# Patient Record
Sex: FEMALE | Race: OTHER | Employment: FULL TIME | URBAN - METROPOLITAN AREA
[De-identification: names, ages, dates, MRNs, and addresses within clinical notes are randomized per-mention and may not be internally consistent; named-entity substitution may affect disease eponyms.]

---

## 2017-02-28 ENCOUNTER — ALLSCRIPTS OFFICE VISIT (OUTPATIENT)
Dept: OTHER | Facility: OTHER | Age: 27
End: 2017-02-28

## 2017-04-17 ENCOUNTER — GENERIC CONVERSION - ENCOUNTER (OUTPATIENT)
Dept: OTHER | Facility: OTHER | Age: 27
End: 2017-04-17

## 2017-04-18 ENCOUNTER — GENERIC CONVERSION - ENCOUNTER (OUTPATIENT)
Dept: OTHER | Facility: OTHER | Age: 27
End: 2017-04-18

## 2017-04-25 ENCOUNTER — ALLSCRIPTS OFFICE VISIT (OUTPATIENT)
Dept: OTHER | Facility: OTHER | Age: 27
End: 2017-04-25

## 2017-06-27 ENCOUNTER — ALLSCRIPTS OFFICE VISIT (OUTPATIENT)
Dept: OTHER | Facility: OTHER | Age: 27
End: 2017-06-27

## 2017-10-12 ENCOUNTER — ALLSCRIPTS OFFICE VISIT (OUTPATIENT)
Dept: OTHER | Facility: OTHER | Age: 27
End: 2017-10-12

## 2017-10-12 LAB
BILIRUB UR QL STRIP: NORMAL
CLARITY UR: NORMAL
COLOR UR: CLEAR
GLUCOSE (HISTORICAL): NORMAL
HGB UR QL STRIP.AUTO: NORMAL
KETONES UR STRIP-MCNC: 4 MG/DL
LEUKOCYTE ESTERASE UR QL STRIP: 1
NITRITE UR QL STRIP: NORMAL
PH UR STRIP.AUTO: 6 [PH]
PROT UR STRIP-MCNC: NORMAL MG/DL
SP GR UR STRIP.AUTO: 1
UROBILINOGEN UR QL STRIP.AUTO: 0.2

## 2017-10-29 NOTE — PROGRESS NOTES
Assessment    1  Bacterial vaginosis (616 10,041 9) (N76 0,B96 89)   2  Burning with urination (788 1) (R30 0)   3  Irregular bleeding (626 4) (N92 6)    Plan  Bacterial vaginosis    · Clotrimazole-Betamethasone 1-0 05 % External Cream; APPLY  AND RUB  IN ATHIN FILM TO AFFECTED AREAS TWICE DAILY  (AM AND PM)   Rx By: Brigid Rios; Dispense: 0 Days ; #:1 X 15 GM Tube; Refill: 1;Bacterial vaginosis; BEENA = N; Sent To: [facility]/PHARMACY #[id number]  · MetroNIDAZOLE 500 MG Oral Tablet; TAKE 1 TABLET TWICE DAILY UNTILFINISHED   Rx By: Brigid Rios; Dispense: 7 Days ; #:14 Tablet; Refill: 0;Bacterial vaginosis; BEENA = N; Sent To: [facility]/PHARMACY #8165 Burning with urination, Urinary frequency    · (1) URINALYSIS (will reflex a microscopy if leukocytes, occult blood, protein or nitrites arenot within normal limits); Status:Active - Retrospective By Protocol Authorization; Requested for:12Oct2017;    Perform:[facility] In Office Collection; ESQ:[id number]; Last Updated By:Lynne Mas; 10/12/2017 4:14:43 PM;Ordered;with urination, Urinary frequency; Ordered By:Riedel, Pietro Fothergill;   · (1) URINE CULTURE; Source:Urine, Clean Catch; Status:Active - Retrospective ByProtocol Authorization; Requested for:12Oct2017;    Perform:[facility] In Office Collection; FCY:[id number]; Last Updated By:Lynne Mas; 10/12/2017 4:14:17 PM;Ordered;with urination, Urinary frequency; Ordered By:Riedel, Pietro Fothergill;    Discussion/Summary  Discussion Summary:   As above patient complains of vaginal burning with urination  UA dip positive for ketones 1+ leukocytes  We will send for culture and sensitivity  Wet mount done positive for bacterial vaginosis recurrent  Will treat with metronidazole 500 mg twice daily for 1 week  Will give topical clotrimazole for external symptoms   Patient prefers not to restart birth control at this time was counseled that if her cycles continue to be irregular and far apart further evaluation would be indicated  Counseling Documentation With Imm: The patient was counseled regarding diagnostic results,-- instructions for management,-- risk factor reductions,-- prognosis,-- impressions,-- risks and benefits of treatment options,-- importance of compliance with treatment,-- Recurrent bacterial vaginosis dysuria, irregular bleeding  total time of encounter was 25 minutes-- and-- 15 minutes was spent counseling  Chief Complaint  Chief Complaint Free Text Note Form: PATIENT IS HERE FOR PROBLEM VISIT, PATIENT C/O BURNING WHEN VOIDING, URINARY FREQUENCY,PATIENT REFUSSES PELVIC EXAM DO TO MENSES      History of Present Illness  HPI: As noted above patient agreed for pelvic exam  Complains of burning with urination been going on for the past week or so  Was here in June for annual exam Pap pelvic and breast exam which was normal  Patient states her periods are for the most part are regular once a month, this last period was somewhat delayed she attributes the lateness due to stress in her personal life  Patient states she is sexually active from time to time however her partner lives in [place] so it is infrequent  Review of Systems  Focused-Female:  Constitutional: No fever, no chills, feels well, no tiredness, no recent weight gain or loss  ENT: no ear ache, no loss of hearing, no nosebleeds or nasal discharge, no sore throat or hoarseness  Cardiovascular: no complaints of slow or fast heart rate, no chest pain, no palpitations, no leg claudication or lower extremity edema  Respiratory: no complaints of shortness of breath, no wheezing, no dyspnea on exertion, no orthopnea or PND  Breasts: no complaints of breast pain, breast lump or nipple discharge  Gastrointestinal: no complaints of abdominal pain, no constipation, no nausea or diarrhea, no vomiting, no bloody stools    Genitourinary: no complaints of dysuria, no incontinence, no pelvic pain, no dysmenorrhea, no vaginal discharge or abnormal vaginal bleeding  Musculoskeletal: no complaints of arthralgia, no myalgia, no joint swelling or stiffness, no limb pain or swelling  Integumentary: no complaints of skin rash or lesion, no itching or dry skin, no skin wounds  Neurological: no complaints of headache, no confusion, no numbness or tingling, no dizziness or fainting  Active Problems  1  Bacterial vaginosis (616 10,041 9) (N76 0,B96 89)   2  Birth control counseling (V25 09) (Z30 09)   3  Encounter for cervical Pap smear with pelvic exam (V76 2,V72 31) (Z01 419)   4  Encounter for IUD removal (V25 12) (Z30 432)   5  Screening for STD (sexually transmitted disease) (V74 5) (Z11 3)    Family History  Mother    1  No pertinent family history  Father    2  No pertinent family history    Social History   · Caffeine use (V49 89) (F15 90)   ·    · Never a smoker   · One child   · Social alcohol use (Z78 9)    Current Meds   1  No Reported Medications Recorded    Allergies  1  No Known Drug Allergies    Vitals  Vital Signs    Recorded: 22QJI1721 79:16RC   Systolic 550, RUE, Sitting   Diastolic 82, RUE, Sitting   Height 5 ft 8 in   Weight 187 lb    BMI Calculated 28 43   BSA Calculated 1 99   LMP 12Oct2017       Physical Exam   Constitutional  General appearance: No acute distress, well appearing and well nourished  Genitourinary  External genitalia: Normal and no lesions appreciated  Vagina: Normal, no lesions or dryness appreciated  -- Light menstrual flow noted  Cervix: Normal, no palpable masses  -- Late menstrual flow noted day 1 of menses  Uterus: Normal, non-tender, not enlarged, and no palpable masses  -- Normal size uterus normal bimanual exam no pelvic masses noted  Adnexa/parametria: Normal, non-tender and no fullness or masses appreciated         Signatures   Electronically signed by : Luis Iqbal DO; Oct 12 2017  4:30PM EST                       (Author)

## 2018-01-12 VITALS
BODY MASS INDEX: 27.58 KG/M2 | DIASTOLIC BLOOD PRESSURE: 80 MMHG | SYSTOLIC BLOOD PRESSURE: 117 MMHG | HEIGHT: 68 IN | WEIGHT: 182 LBS

## 2018-01-13 VITALS
HEIGHT: 68 IN | WEIGHT: 183.25 LBS | SYSTOLIC BLOOD PRESSURE: 120 MMHG | BODY MASS INDEX: 27.77 KG/M2 | DIASTOLIC BLOOD PRESSURE: 82 MMHG

## 2018-01-13 VITALS
SYSTOLIC BLOOD PRESSURE: 112 MMHG | DIASTOLIC BLOOD PRESSURE: 82 MMHG | WEIGHT: 187 LBS | BODY MASS INDEX: 28.34 KG/M2 | HEIGHT: 68 IN

## 2018-01-13 NOTE — MISCELLANEOUS
Provider Comments  Provider Comments:   pt no show 04/18/2017 tried reaching on phone unable to hear me and pt hung up      Signatures   Electronically signed by :  [facility], ; Apr 18 2017  4:56PM EST                       (Author)

## 2018-01-14 VITALS
DIASTOLIC BLOOD PRESSURE: 82 MMHG | WEIGHT: 180.25 LBS | BODY MASS INDEX: 27.32 KG/M2 | HEIGHT: 68 IN | SYSTOLIC BLOOD PRESSURE: 120 MMHG

## 2018-01-16 ENCOUNTER — ALLSCRIPTS OFFICE VISIT (OUTPATIENT)
Dept: OTHER | Facility: OTHER | Age: 28
End: 2018-01-16

## 2018-01-16 DIAGNOSIS — O20.9 HEMORRHAGE IN EARLY PREGNANCY: ICD-10-CM

## 2018-01-16 LAB — HCG, QUALITATIVE (HISTORICAL): POSITIVE

## 2018-01-17 ENCOUNTER — APPOINTMENT (OUTPATIENT)
Dept: LAB | Facility: HOSPITAL | Age: 28
End: 2018-01-17
Attending: OBSTETRICS & GYNECOLOGY
Payer: COMMERCIAL

## 2018-01-17 DIAGNOSIS — O20.9 HEMORRHAGE IN EARLY PREGNANCY: ICD-10-CM

## 2018-01-17 LAB
ABO GROUP BLD: NORMAL
B-HCG SERPL-ACNC: 9896.5 MIU/ML
BLD GP AB SCN SERPL QL: NEGATIVE
PROGEST SERPL-MCNC: 16.2 NG/ML
RH BLD: POSITIVE
SPECIMEN EXPIRATION DATE: NORMAL
TSH SERPL DL<=0.05 MIU/L-ACNC: 1.09 UIU/ML (ref 0.36–3.74)

## 2018-01-17 PROCEDURE — 36415 COLL VENOUS BLD VENIPUNCTURE: CPT

## 2018-01-17 PROCEDURE — 86901 BLOOD TYPING SEROLOGIC RH(D): CPT

## 2018-01-17 PROCEDURE — 84144 ASSAY OF PROGESTERONE: CPT

## 2018-01-17 PROCEDURE — 84702 CHORIONIC GONADOTROPIN TEST: CPT

## 2018-01-17 PROCEDURE — 86900 BLOOD TYPING SEROLOGIC ABO: CPT

## 2018-01-17 PROCEDURE — 86850 RBC ANTIBODY SCREEN: CPT

## 2018-01-17 PROCEDURE — 84443 ASSAY THYROID STIM HORMONE: CPT

## 2018-01-17 NOTE — MISCELLANEOUS
Provider Comments  Provider Comments:   pt no showed today pt does have an appointment tomorrow 04/18/2017 for her annual      Signatures   Electronically signed by : Forrest Parra, ; Apr 17 2017  4:53PM EST                       (Author)

## 2018-01-23 VITALS
DIASTOLIC BLOOD PRESSURE: 64 MMHG | SYSTOLIC BLOOD PRESSURE: 112 MMHG | WEIGHT: 185.13 LBS | BODY MASS INDEX: 28.06 KG/M2 | HEIGHT: 68 IN

## 2018-01-23 NOTE — MISCELLANEOUS
Message  Return to work or school:   Maribell Lee is under my professional care  She was seen in my office on 1/16/18   She is able to return to work on  1/16/18      Please excuse patient from work early this morning due to her scheduled appointment          Signatures   Electronically signed by : GREG Gaspar ; Jan 16 2018  7:53AM EST                       (Author)

## 2018-01-29 ENCOUNTER — TRANSCRIBE ORDERS (OUTPATIENT)
Dept: ADMINISTRATIVE | Facility: HOSPITAL | Age: 28
End: 2018-01-29

## 2018-01-29 ENCOUNTER — OFFICE VISIT (OUTPATIENT)
Dept: OBGYN CLINIC | Facility: CLINIC | Age: 28
End: 2018-01-29
Payer: COMMERCIAL

## 2018-01-29 ENCOUNTER — APPOINTMENT (OUTPATIENT)
Dept: LAB | Facility: HOSPITAL | Age: 28
End: 2018-01-29
Attending: OBSTETRICS & GYNECOLOGY
Payer: COMMERCIAL

## 2018-01-29 ENCOUNTER — HOSPITAL ENCOUNTER (OUTPATIENT)
Dept: ULTRASOUND IMAGING | Facility: HOSPITAL | Age: 28
Discharge: HOME | End: 2018-01-29
Attending: OBSTETRICS & GYNECOLOGY
Payer: COMMERCIAL

## 2018-01-29 VITALS
SYSTOLIC BLOOD PRESSURE: 130 MMHG | DIASTOLIC BLOOD PRESSURE: 84 MMHG | HEIGHT: 68 IN | BODY MASS INDEX: 28.98 KG/M2 | WEIGHT: 191.2 LBS

## 2018-01-29 DIAGNOSIS — O20.0 THREATENED ABORTION: ICD-10-CM

## 2018-01-29 DIAGNOSIS — O20.9 BLEEDING IN EARLY PREGNANCY: ICD-10-CM

## 2018-01-29 DIAGNOSIS — O20.0 THREATENED ABORTION: Primary | ICD-10-CM

## 2018-01-29 LAB
B-HCG SERPL-ACNC: ABNORMAL MIU/ML
PROGEST SERPL-MCNC: 14.9 NG/ML

## 2018-01-29 PROCEDURE — 36415 COLL VENOUS BLD VENIPUNCTURE: CPT

## 2018-01-29 PROCEDURE — 76815 OB US LIMITED FETUS(S): CPT

## 2018-01-29 PROCEDURE — 99214 OFFICE O/P EST MOD 30 MIN: CPT | Performed by: OBSTETRICS & GYNECOLOGY

## 2018-01-29 PROCEDURE — 84702 CHORIONIC GONADOTROPIN TEST: CPT

## 2018-01-29 PROCEDURE — 76830 TRANSVAGINAL US NON-OB: CPT | Performed by: OBSTETRICS & GYNECOLOGY

## 2018-01-29 PROCEDURE — 84144 ASSAY OF PROGESTERONE: CPT

## 2018-01-29 RX ORDER — DOCUSATE SODIUM 100 MG/1
100 CAPSULE, LIQUID FILLED ORAL 2 TIMES DAILY
COMMUNITY
End: 2018-05-25 | Stop reason: ALTCHOICE

## 2018-01-29 NOTE — PROGRESS NOTES
Assessment/Plan    Problem List Items Addressed This Visit        Other    Threatened  - Primary    Relevant Orders    hCG, quantitative    Progesterone    AMB US OB < 14 weeks single or first gestation level 1    Bleeding in early pregnancy    Relevant Orders    hCG, quantitative    Progesterone    AMB US OB < 14 weeks single or first gestation level 1        Possible failed pregnancy  She is measuring 6 weeks and 2/7 weeks by gestational sac with no fetal pole and no yolk sac  She did have an ultrasound 2 weeks ago that showed a gestational sac at 5 weeks and 4/7 days  She was sent to [facility] Department for formal ultrasound to rule out ectopic pregnancy  She was asked to return in 2 days for discussion of results    In the meantime, I did discussed with patient the prognosis  I discussed with patient that if this were a failed pregnancy, that we can treat her with expectant management versus medical versus surgical completion  In the meantime, I asked for her to call if her bleeding increases or if she has severe pain  Subjective   31 y/o  here for a problem visit  She was seen on 18 for a pregnancy confirmation  She had a h/o 1  x 1, no previous problems, 9 lbs and 1 VIP     Her LMP was on 17  She is 7 2/7 days by LMP with GEORGE on 9/15/18  She comes in for a problem  Her periods are regular, occurring every 28 days  She has not been on contraception recently  When she was seen 2 weeks ago, she had an ultrasound limited in the office that showed a gestational sac at 5 weeks and 4/7 days  Labs on 18  Her blood type is A positive  Progesterone was 16 20  HCG quantitative at 9896 5    She is complaining of vaginal bleeding since Thursday  She states that her bleeding on Thursday was fairly heavy with passing clots that were small  She changes about 2-3 pads per day  She is only spotting today she denies any pelvic pain or cramping    She does complain of a headache  Review of Systems   Constitutional: Negative  HENT: Negative  Eyes: Negative  Respiratory: Negative  Cardiovascular: Negative  Gastrointestinal: Negative  Endocrine: Negative  Genitourinary: Positive for vaginal bleeding  As noted in HPI   Musculoskeletal: Negative  Skin: Negative  Allergic/Immunologic: Negative  Neurological: Negative  Hematological: Negative  Psychiatric/Behavioral: Negative  Physical Exam   Constitutional: She is oriented to person, place, and time  She appears well-developed and well-nourished  Genitourinary: Uterus normal  There is bleeding in the vagina  Right adnexum does not display mass, does not display tenderness and does not display fullness  Left adnexum does not display mass, does not display tenderness and does not display fullness  Cervix does not exhibit motion tenderness or discharge  Uterus is anteverted  Uterus is not tender or mobile  Abdominal: Soft  There is no tenderness  There is no rebound and no guarding  Neurological: She is alert and oriented to person, place, and time  Psychiatric: She has a normal mood and affect  Scant amount of bleeding, os is closed         Imaging  Limited office ultrasound:  Shows a gestational sac 1 73 x 0 94 x 1 65 cm, 6 2/7 weeks  No fetal pole or yolk sac

## 2018-01-29 NOTE — PATIENT INSTRUCTIONS
Threatened Miscarriage   WHAT YOU NEED TO KNOW:   A threatened miscarriage occurs when you have vaginal bleeding within the first 20 weeks of pregnancy  It means that a miscarriage may happen  A threatened miscarriage may also be called a threatened   DISCHARGE INSTRUCTIONS:   Return to the emergency department if:   · You feel weak or faint  · Your pain or cramping in your abdomen or back gets worse  · You have vaginal bleeding that soaks 1 or more pads in an hour  · You pass material that looks like tissue or large clots  Contact your healthcare provider or obstetrician if:   · You have a fever  · You have trouble urinating, burning when you urinate, or feel a need to urinate often  · You have new or worsening vaginal bleeding  · You have vaginal pain or itching, or vaginal discharge that is yellow, green, or foul-smelling  · You have questions or concerns about your condition or care  Self-care: The following may help you manage your symptoms and decrease your risk for a miscarriage:  · Do not put anything in your vagina  Do not have sex, douche, or use tampons  These actions may increase your risk for infection and miscarriage  · Rest as directed  Do not exercise or do strenuous activities  These activities may cause  labor or miscarriage  Ask your healthcare provider what activities are okay to do  Stay healthy during pregnancy:   · Eat a variety of healthy foods  Healthy foods can help you get extra protein, water, and calories that you need while you are pregnant  Healthy foods include fruits, vegetables, whole-grain breads, low-fat dairy products, beans, lean meats, and fish  Avoid raw or undercooked meat and fish  Ask your healthcare provider if you need a special diet  · Take prenatal vitamins as directed  These help you get the right amount of vitamins and minerals  They may also decrease the risk of certain birth defects      · Do not drink alcohol or use illegal drugs  These can increase your risk for a miscarriage or harm your baby  · Do not smoke  Nicotine and other chemicals in cigarettes and cigars can harm your baby and cause miscarriage or  labor  Ask your healthcare provider for information if you currently smoke and need help to quit  E-cigarettes or smokeless tobacco still contain nicotine  Do not use these products  · Decrease your risk for an infection  Always wash your hands before eating or preparing meals  Do not spend time with people who are sick  Ask your healthcare provider if you need immunizations such as the flu or hepatitis B vaccine  Immunizations may decrease your risk for infections that could cause a miscarriage  · Manage your medical conditions  Keep your blood pressure and blood sugars under control  Maintain a healthy weight during pregnancy  Follow up with your obstetrician as directed: You may need to see your obstetrician frequently for ultrasounds or blood tests  Write down your questions so you remember to ask them during your visits  ©  26[address] [employer] Information is for End User's use only and may not be sold, redistributed or otherwise used for commercial purposes  All illustrations and images included in CareNotes® are the copyrighted property of [employer]  or [employer] Barreto  The above information is an  only  It is not intended as medical advice for individual conditions or treatments  Talk to your doctor, nurse or pharmacist before following any medical regimen to see if it is safe and effective for you

## 2018-01-30 ENCOUNTER — OFFICE VISIT (OUTPATIENT)
Dept: OBGYN CLINIC | Facility: CLINIC | Age: 28
End: 2018-01-30
Payer: COMMERCIAL

## 2018-01-30 VITALS — BODY MASS INDEX: 28.55 KG/M2 | SYSTOLIC BLOOD PRESSURE: 130 MMHG | WEIGHT: 187.8 LBS | DIASTOLIC BLOOD PRESSURE: 80 MMHG

## 2018-01-30 DIAGNOSIS — O20.9 BLEEDING IN EARLY PREGNANCY: ICD-10-CM

## 2018-01-30 DIAGNOSIS — O20.0 THREATENED ABORTION: Primary | ICD-10-CM

## 2018-01-30 PROCEDURE — 99213 OFFICE O/P EST LOW 20 MIN: CPT | Performed by: OBSTETRICS & GYNECOLOGY

## 2018-01-30 NOTE — PATIENT INSTRUCTIONS
Threatened Miscarriage   WHAT YOU NEED TO KNOW:   A threatened miscarriage occurs when you have vaginal bleeding within the first 20 weeks of pregnancy  It means that a miscarriage may happen  A threatened miscarriage may also be called a threatened   DISCHARGE INSTRUCTIONS:   Return to the emergency department if:   · You feel weak or faint  · Your pain or cramping in your abdomen or back gets worse  · You have vaginal bleeding that soaks 1 or more pads in an hour  · You pass material that looks like tissue or large clots  Contact your healthcare provider or obstetrician if:   · You have a fever  · You have trouble urinating, burning when you urinate, or feel a need to urinate often  · You have new or worsening vaginal bleeding  · You have vaginal pain or itching, or vaginal discharge that is yellow, green, or foul-smelling  · You have questions or concerns about your condition or care  Self-care: The following may help you manage your symptoms and decrease your risk for a miscarriage:  · Do not put anything in your vagina  Do not have sex, douche, or use tampons  These actions may increase your risk for infection and miscarriage  · Rest as directed  Do not exercise or do strenuous activities  These activities may cause  labor or miscarriage  Ask your healthcare provider what activities are okay to do  Stay healthy during pregnancy:   · Eat a variety of healthy foods  Healthy foods can help you get extra protein, water, and calories that you need while you are pregnant  Healthy foods include fruits, vegetables, whole-grain breads, low-fat dairy products, beans, lean meats, and fish  Avoid raw or undercooked meat and fish  Ask your healthcare provider if you need a special diet  · Take prenatal vitamins as directed  These help you get the right amount of vitamins and minerals  They may also decrease the risk of certain birth defects      · Do not drink alcohol or use illegal drugs  These can increase your risk for a miscarriage or harm your baby  · Do not smoke  Nicotine and other chemicals in cigarettes and cigars can harm your baby and cause miscarriage or  labor  Ask your healthcare provider for information if you currently smoke and need help to quit  E-cigarettes or smokeless tobacco still contain nicotine  Do not use these products  · Decrease your risk for an infection  Always wash your hands before eating or preparing meals  Do not spend time with people who are sick  Ask your healthcare provider if you need immunizations such as the flu or hepatitis B vaccine  Immunizations may decrease your risk for infections that could cause a miscarriage  · Manage your medical conditions  Keep your blood pressure and blood sugars under control  Maintain a healthy weight during pregnancy  Follow up with your obstetrician as directed: You may need to see your obstetrician frequently for ultrasounds or blood tests  Write down your questions so you remember to ask them during your visits  ©  [employer] Information is for End User's use only and may not be sold, redistributed or otherwise used for commercial purposes  All illustrations and images included in CareNotes® are the copyrighted property of [employer]  or [employer] Barreto  The above information is an  only  It is not intended as medical advice for individual conditions or treatments  Talk to your doctor, nurse or pharmacist before following any medical regimen to see if it is safe and effective for you

## 2018-01-30 NOTE — PROGRESS NOTES
Assessment/Plan  Problem List Items Addressed This Visit        Other    Threatened  - Primary    Relevant Orders    hCG, quantitative    US OB < 14 weeks single or first gestation level 1    Bleeding in early pregnancy    Relevant Orders    hCG, quantitative    US OB < 14 weeks single or first gestation level 1            Patient with possible failed intrauterine pregnancy  Discussed with patient recommendations to repeat an HCG quant in 2 days and to repeat a pelvic ultrasound in 1 week  I doubt she is having an ectopic pregnancy although this is still a possibility  I discussed with patient return parameters and instructions to call if with heavy bleeding or severe pelvic pain  If she is diagnosed with a failed intrauterine pregnancy, we will further  discuss options that I mentioned to patient today including expectant management, medical management versus surgical management of possibly a missed  or blighted ovum  She was asked to return in 1 week for discussion of results and follow-up  Subjective    Patient is a 78-year-old  3 para 1011 here for discussion of results  She was seen yesterday for vaginal bleeding in early pregnancy  Her LMP was 2017 and she is 7 weeks and 2/7 days by LMP  She is complaining of bleeding and some cramping  A pelvic ultrasound was done in the office that showed a gestational sac with no fetal pole or yolk sac  She was then sent to radiology for formal ultrasound  Repeat laboratory testing was also performed  Her Rh type is positive  The ultrasound showed a gestational sac of 15 mm with no fetal pole or yolk sac  This measured 5 weeks and 6/7 days  There was also small subchorionic hemorrhage measuring 1 1 cm  Of note 2 weeks ago, she had a limited ultrasound in the office that showed a gestational sac measuring 5 weeks and 4/7 days  At that time, her HCG was 9896  Her HCG yesterday was 32508 8      She is currently not bleeding today she denies pelvic pain  She presents today for discussion of results and further management      Menstrual History:  OB History      Para Term  AB Living    2 1       1    SAB TAB Ectopic Multiple Live Births            1           Review of Systems  Review of Systems    Objective   OBGyn Exam

## 2018-02-14 ENCOUNTER — TELEPHONE (OUTPATIENT)
Dept: OBGYN CLINIC | Facility: CLINIC | Age: 28
End: 2018-02-14

## 2018-02-14 NOTE — TELEPHONE ENCOUNTER
----- Message from Suki Wiggins MD sent at 2/14/2018  2:03 PM EST -----  Regarding: no show  Patient missed her appointment today  She needs follow-up ultrasound and blood work as well  Can we follow-up to see if she would like to still return here or what's going on with her

## 2018-02-15 ENCOUNTER — TELEPHONE (OUTPATIENT)
Dept: OBGYN CLINIC | Facility: CLINIC | Age: 28
End: 2018-02-15

## 2018-05-25 ENCOUNTER — HOSPITAL ENCOUNTER (EMERGENCY)
Facility: HOSPITAL | Age: 28
Discharge: HOME | End: 2018-05-25
Attending: EMERGENCY MEDICINE | Admitting: EMERGENCY MEDICINE
Payer: COMMERCIAL

## 2018-05-25 VITALS
OXYGEN SATURATION: 99 % | DIASTOLIC BLOOD PRESSURE: 78 MMHG | HEART RATE: 87 BPM | TEMPERATURE: 97.8 F | WEIGHT: 185.4 LBS | RESPIRATION RATE: 16 BRPM | BODY MASS INDEX: 28.19 KG/M2 | SYSTOLIC BLOOD PRESSURE: 128 MMHG

## 2018-05-25 DIAGNOSIS — N93.9 ABNORMAL VAGINAL BLEEDING: Primary | ICD-10-CM

## 2018-05-25 LAB
ANION GAP BLD CALC-SCNC: 18 MMOL/L (ref 4–13)
BACTERIA UR QL AUTO: ABNORMAL /HPF
BILIRUB UR QL STRIP: NEGATIVE
BUN BLD-MCNC: 7 MG/DL (ref 5–25)
CA-I BLD-SCNC: 1.18 MMOL/L (ref 1.12–1.32)
CHLORIDE BLD-SCNC: 103 MMOL/L (ref 100–108)
CLARITY UR: CLEAR
COLOR UR: ABNORMAL
COLOR, POC: YELLOW
CREAT BLD-MCNC: 0.8 MG/DL (ref 0.6–1.3)
EXT PREG TEST URINE: NEGATIVE
GFR SERPL CREATININE-BSD FRML MDRD: 99 ML/MIN/1.73SQ M
GLUCOSE SERPL-MCNC: 98 MG/DL (ref 65–140)
GLUCOSE UR STRIP-MCNC: NEGATIVE MG/DL
HCT VFR BLD CALC: 34 % (ref 34.8–46.1)
HGB BLDA-MCNC: 11.6 G/DL (ref 11.5–15.4)
HGB UR QL STRIP.AUTO: ABNORMAL
KETONES UR STRIP-MCNC: NEGATIVE MG/DL
LEUKOCYTE ESTERASE UR QL STRIP: NEGATIVE
NITRITE UR QL STRIP: NEGATIVE
NON-SQ EPI CELLS URNS QL MICRO: ABNORMAL /HPF
PCO2 BLD: 25 MMOL/L (ref 21–32)
PH UR STRIP.AUTO: 5 [PH] (ref 4.5–8)
POTASSIUM BLD-SCNC: 3.6 MMOL/L (ref 3.5–5.3)
PROT UR STRIP-MCNC: NEGATIVE MG/DL
RBC #/AREA URNS AUTO: ABNORMAL /HPF
SODIUM BLD-SCNC: 141 MMOL/L (ref 136–145)
SP GR UR STRIP.AUTO: 1.01 (ref 1–1.03)
SPECIMEN SOURCE: ABNORMAL
UROBILINOGEN UR QL STRIP.AUTO: 0.2 E.U./DL
WBC #/AREA URNS AUTO: ABNORMAL /HPF

## 2018-05-25 PROCEDURE — 81001 URINALYSIS AUTO W/SCOPE: CPT

## 2018-05-25 PROCEDURE — 80047 BASIC METABLC PNL IONIZED CA: CPT

## 2018-05-25 PROCEDURE — 85014 HEMATOCRIT: CPT

## 2018-05-25 PROCEDURE — 81025 URINE PREGNANCY TEST: CPT | Performed by: EMERGENCY MEDICINE

## 2018-05-25 PROCEDURE — 99284 EMERGENCY DEPT VISIT MOD MDM: CPT

## 2018-05-25 RX ORDER — TRAMADOL HYDROCHLORIDE 50 MG/1
50 TABLET ORAL EVERY 6 HOURS PRN
Qty: 10 TABLET | Refills: 0 | Status: SHIPPED | OUTPATIENT
Start: 2018-05-25 | End: 2022-08-03 | Stop reason: ALTCHOICE

## 2018-05-25 RX ORDER — MEDROXYPROGESTERONE ACETATE 10 MG/1
10 TABLET ORAL DAILY
Qty: 7 TABLET | Refills: 0 | Status: SHIPPED | OUTPATIENT
Start: 2018-05-25 | End: 2022-08-03 | Stop reason: ALTCHOICE

## 2018-05-25 RX ORDER — TRAMADOL HYDROCHLORIDE 50 MG/1
50 TABLET ORAL ONCE
Status: COMPLETED | OUTPATIENT
Start: 2018-05-25 | End: 2018-05-25

## 2018-05-25 RX ADMIN — TRAMADOL HYDROCHLORIDE 50 MG: 50 TABLET, FILM COATED ORAL at 19:01

## 2018-05-25 NOTE — ED PROVIDER NOTES
[facility] [address]    [address]    [address]    [facility] [address]    Phone:  [phone]    Fax:  [phone]       Thank You for choosing us for your health care visit. We are glad to serve you and happy to provide you with this summary of your visit. Please help us to ensure we have accurate records. If you find anything that needs to be changed, please let our staff know as soon as possible.          Your Demographic Information     Patient Name Sex Ronnie Dunbar Male 1970       Ethnic Group Patient Race    Not of  or  Origin White      Your Visit Details     Date & Time Provider Department    2017 3:15 PM Toya Ardon MD [facility] [address]      Your Vitals Were     BP Pulse Height Weight BMI Smoking Status    130/80 59 5' 6.5\" (1.689 m) 207 lb (93.9 kg) 32.91 kg/m2 Never Smoker      Medications Prescribed or Re-Ordered Today     None      Your Current Medications Are        Disp Refills Start End    apixaban (ELIQUIS) 5 MG Tab 60 tablet  2017     Sig - Route: Take by mouth every 12 hours. - Oral    Class: Historical Med    rosuvastatin (CRESTOR) 10 MG tablet   2017     Sig - Route: Take 1 tablet by mouth daily. - Oral    Class: Historical Med    amiodarone (PACERONE,CORDARONE) 200 MG tablet   2017     Sig - Route: Take 1 tablet by mouth 2 times daily. - Oral    Class: Historical Med    Coenzyme Q10 (CO Q 10) 10 MG Cap 30 capsule  2015     Class: Historical Med    Route: Oral    Cholecalciferol (VITAMIN D-3) 1000 UNITS Cap   2015     Sig - Route: 2 tabs daily - Oral    Class: Historical Med    losartan-hydrochlorothiazide (HYZAAR) 50-12.5 MG per tablet   2013     Sig - Route: Take 1 tablet by mouth daily. - Oral    Class: Historical Med      Discontinued Medications        Reason for Discontinue    aspirin 81 MG tablet Therapy Completed    History  Chief Complaint   Patient presents with    Abdominal Pain     Pt reports "really strong pelvic pains and it's going into my legs", last dose ibuprofen 600mg at 1400, worsening since Monday, constant, "cramping, achy, stabbing", has had period since 5/14, changed pad x 3 today, miscarriage in February    Vaginal Bleeding     64-year-old female with no past medical history presents to the emergency department with a 10 day history of vaginal bleeding  She started her menses 10 days ago at its normal time and she has been bleeding ever since  She uses on average 3 pads a day  She believes the bleeding is getting heavier and she is passing clots  She is also complaining of worsening lower abdominal cramping radiating to her thighs  No fevers or vaginal discharge  She took Motrin without relief  She states since she had a spontaneous miscarriage in February of this year her periods have been abnormal   She states she called her gynecologist and she could not get an appointment until later next week  History provided by:  Patient   used: No    Vaginal Bleeding   Quality:  Heavier than menses and clots  Severity:  Unable to specify  Onset quality:  Gradual  Duration:  10 days  Timing:  Constant  Progression:  Worsening  Chronicity:  New  Menstrual history:  Irregular  Number of pads used:   Three per day  Possible pregnancy: no    Context: spontaneously    Relieved by:  Nothing  Worsened by:  Nothing  Ineffective treatments:  Ibuprofen  Associated symptoms: abdominal pain    Associated symptoms: no back pain, no dizziness, no dysuria, no fatigue, no fever, no nausea and no vaginal discharge    Abdominal pain:     Location:  LLQ, RLQ and suprapubic    Quality: cramping      Severity:  Moderate    Onset quality:  Gradual    Duration:  10 days    Timing:  Constant    Progression:  Worsening    Chronicity:  New  Risk factors: no hx of endometriosis, no gynecological surgery and no metoPROLOL (TOPROL-XL) 25 MG 24 hr tablet Therapy Completed    WARFARIN - PHYSICIAN MONITORED Therapy Completed    Choline Fenofibrate (TRILIPIX) 135 MG capsule Therapy Completed    propafenone (RYTHMOL SR) 225 MG 12 hr capsule Therapy Completed    carvedilol (COREG) 12.5 MG tablet Dose Adjustment      Allergies     No Known Allergies      Problem List as of 2/13/2017     Atrial fibrillation    Atrial flutter with rapid ventricular response    VT (ventricular tachycardia)    Amiodarone therapy    Eliquis therapy            Patient Instructions     None       terminated pregnancies        None       History reviewed  No pertinent past medical history  Past Surgical History:   Procedure Laterality Date    TUMOR REMOVAL         Family History   Problem Relation Age of Onset    No Known Problems Mother     No Known Problems Father      I have reviewed and agree with the history as documented  Social History   Substance Use Topics    Smoking status: Never Smoker    Smokeless tobacco: Never Used    Alcohol use Yes      Comment: SOCIAL        Review of Systems   Constitutional: Negative  Negative for chills, diaphoresis, fatigue and fever  HENT: Negative  Negative for congestion, rhinorrhea and sore throat  Eyes: Negative  Negative for discharge, redness and itching  Respiratory: Negative  Negative for apnea, cough, chest tightness, shortness of breath and wheezing  Cardiovascular: Negative for chest pain, palpitations and leg swelling  Gastrointestinal: Positive for abdominal pain  Negative for nausea  Endocrine: Negative  Genitourinary: Positive for vaginal bleeding  Negative for dysuria, flank pain, frequency, genital sores, urgency and vaginal discharge  Musculoskeletal: Negative  Negative for back pain  Skin: Negative  Allergic/Immunologic: Negative  Neurological: Negative  Negative for dizziness, syncope, weakness, light-headedness, numbness and headaches  Hematological: Negative  All other systems reviewed and are negative  Physical Exam  Physical Exam   Constitutional: She is oriented to person, place, and time  She appears well-developed and well-nourished  Non-toxic appearance  She does not have a sickly appearance  She does not appear ill  No distress  HENT:   Head: Normocephalic and atraumatic  Right Ear: External ear normal    Left Ear: External ear normal    Mouth/Throat: Oropharynx is clear and moist    Eyes: Conjunctivae are normal  Pupils are equal, round, and reactive to light   Right eye exhibits no discharge  Left eye exhibits no discharge  No scleral icterus  Cardiovascular: Normal rate, regular rhythm and normal heart sounds  Exam reveals no gallop and no friction rub  No murmur heard  Pulmonary/Chest: Effort normal and breath sounds normal  No respiratory distress  She has no wheezes  She has no rales  She exhibits no tenderness  Abdominal: Soft  Bowel sounds are normal  She exhibits no distension and no mass  There is no tenderness  No hernia  Musculoskeletal: She exhibits no tenderness  Neurological: She is alert and oriented to person, place, and time  She has normal reflexes  She exhibits normal muscle tone  Skin: Skin is warm and dry  No rash noted  She is not diaphoretic  No erythema  No pallor  Psychiatric: She has a normal mood and affect  Nursing note and vitals reviewed        Vital Signs  ED Triage Vitals [05/25/18 1716]   Temperature Pulse Respirations Blood Pressure SpO2   97 8 °F (36 6 °C) 81 18 149/87 98 %      Temp Source Heart Rate Source Patient Position - Orthostatic VS BP Location FiO2 (%)   Oral Monitor Sitting Right arm --      Pain Score       Worst Possible Pain           Vitals:    05/25/18 1716 05/25/18 1815 05/25/18 1929   BP: 149/87 134/85 128/78   Pulse: 81 90 87   Patient Position - Orthostatic VS: Sitting Sitting        Visual Acuity      ED Medications  Medications   traMADol (ULTRAM) tablet 50 mg (50 mg Oral Given 5/25/18 1901)       Diagnostic Studies  Results Reviewed     Procedure Component Value Units Date/Time    POCT Chem 8+ [[id number]]  (Abnormal) Collected:  05/25/18 1910    Lab Status:  Final result Updated:  05/25/18 1914     SODIUM, I-STAT 141 mmol/l      Potassium, i-STAT 3 6 mmol/L      Chloride, istat 103 mmol/L      CO2, i-STAT 25 mmol/L      Anion Gap, Istat 18 (H) mmol/L      Calcium, Ionized i-STAT 1 18 mmol/L      BUN, I-STAT 7 mg/dl      Creatinine, i-STAT 0 8 mg/dl      eGFR 99 ml/min/1 73sq m      Glucose, i-STAT 98 mg/dl      Hct, i-STAT 34 (L) %      Hgb, i-STAT 11 6 g/dl      Specimen Type VENOUS    Urine Microscopic [[id number]]  (Abnormal) Collected:  05/25/18 1822    Lab Status:  Final result Specimen:  Urine from Urine, Clean Catch Updated:  05/25/18 1858     RBC, UA None Seen /hpf      WBC, UA 0-1 (A) /hpf      Epithelial Cells Occasional /hpf      Bacteria, UA Occasional /hpf     POCT urinalysis dipstick [[id number]]  (Normal) Resulted:  05/25/18 1822    Lab Status:  Final result Updated:  05/25/18 1823     Color, UA yellow    POCT pregnancy, urine [[id number]]  (Normal) Resulted:  05/25/18 1822    Lab Status:  Final result Updated:  05/25/18 1822     EXT PREG TEST UR (Ref: Negative) negative    ED Urine Macroscopic [[id number]]  (Abnormal) Collected:  05/25/18 1822    Lab Status:  Final result Specimen:  Urine Updated:  05/25/18 1820     Color, UA Bloody     Clarity, UA Clear     pH, UA 5 0     Leukocytes, UA Negative     Nitrite, UA Negative     Protein, UA Negative mg/dl      Glucose, UA Negative mg/dl      Ketones, UA Negative mg/dl      Urobilinogen, UA 0 2 E U /dl      Bilirubin, UA Negative     Blood, UA Large (A)     Specific [place], UA 1 010    Narrative:       CLINITEK RESULT                 No orders to display              Procedures  Procedures       Phone Contacts  ED Phone Contact    ED Course                               MDM  Number of Diagnoses or Management Options  Diagnosis management comments: 78-year-old female presents with abnormal vaginal bleeding over the last 10 days  She states since having a miscarriage in February she has been having abnormal periods  On exam she appears well and is in no distress  Her abdomen is nontender on exam   Will check urine to rule out pregnancy  Will check hemoglobin  Ultimately patient will need to follow up with her OBGYN  Will give stronger pain medication and also try a course of Provera         Amount and/or Complexity of Data Reviewed  Clinical lab tests: ordered and reviewed  Independent visualization of images, tracings, or specimens: yes      CritCare Time    Disposition  Final diagnoses:   Abnormal vaginal bleeding     Time reflects when diagnosis was documented in both MDM as applicable and the Disposition within this note     Time User Action Codes Description Comment    5/25/2018  7:42 PM Jessica SINGLETON Add [N93 9] Abnormal vaginal bleeding       ED Disposition     ED Disposition Condition Comment    Discharge  Jennifre Matthew discharge to home/self care  Condition at discharge: Good        Follow-up Information     Follow up With Specialties Details Why Contact Info       Follow-up with your OBGYN next week           Patient's Medications   Discharge Prescriptions    MEDROXYPROGESTERONE (PROVERA) 10 MG TABLET    Take 1 tablet (10 mg total) by mouth daily for 7 days       Start Date: 5/25/2018 End Date: 6/1/2018       Order Dose: 10 mg       Quantity: 7 tablet    Refills: 0    TRAMADOL (ULTRAM) 50 MG TABLET    Take 1 tablet (50 mg total) by mouth every 6 (six) hours as needed for moderate pain       Start Date: 5/25/2018 End Date: --       Order Dose: 50 mg       Quantity: 10 tablet    Refills: 0     No discharge procedures on file      ED Provider  Electronically Signed by           Serena Ruiz DO  05/25/18 1942

## 2018-05-25 NOTE — DISCHARGE INSTRUCTIONS
Dysfunctional Uterine Bleeding   WHAT YOU NEED TO KNOW:   Dysfunctional uterine bleeding (DUB) is abnormal uterine bleeding that is caused by a problem with your hormones  You may have bleeding from your uterus at times other than your normal monthly period  Your monthly periods may last longer or shorter, and bleeding may be heavier or lighter than usual    DISCHARGE INSTRUCTIONS:   Medicines:   · Hormones  help decrease bleeding by making your monthly periods more regular  Sometimes this medicine may be given as birth control pills  · NSAIDs  help decrease swelling, pain, and fever  This medicine is available with or without a doctor's order  NSAIDs can cause stomach bleeding or kidney problems in certain people  If you take blood thinner medicine, always ask your healthcare provider if NSAIDs are safe for you  Always read the medicine label and follow directions  · Iron supplements  may be given if your blood iron level decreases because of heavy bleeding  Iron may make you constipated  Ask your healthcare provider for ways to prevent or treat constipation  Iron may also make your bowel movements turn dark or black  · Take your medicine as directed  Contact your healthcare provider if you think your medicine is not helping or if you have side effects  Tell him or her if you are allergic to any medicine  Keep a list of the medicines, vitamins, and herbs you take  Include the amounts, and when and why you take them  Bring the list or the pill bottles to follow-up visits  Carry your medicine list with you in case of an emergency  Follow up with your healthcare provider as directed:  Write down your questions so you remember to ask them during your visits  Self-care:   · Apply heat  on your lower abdomen for 20 to 30 minutes every 2 hours for as many days as directed  Heat helps decrease pain and muscle spasms  · Include foods high in iron  if needed   Examples of foods high in iron are leafy green vegetables, beef, pork, liver, eggs, and whole-grain breads and cereals  · Keep a diary of your menstrual cycles  Keep track of the number of tampons or pads you use each day  · Talk to your healthcare provider before you start a weight loss program   You may need to wait until the abnormal bleeding has stopped before you try to lose weight  The amount of iron in your blood should be normal before you lose weight  Contact your healthcare provider if:   · You need to change your sanitary pad or tampon more than once an hour  · Your medicine causes nausea, vomiting, or diarrhea  · You have questions or concerns about your condition or care  Return to the emergency department if:   · You continue to bleed heavily, or you feel faint  © 2017 [address]  Information is for End User's use only and may not be sold, redistributed or otherwise used for commercial purposes  All illustrations and images included in CareNotes® are the copyrighted property of A [employer] M [employer]  or [employer]re[employer]  The above information is an  only  It is not intended as medical advice for individual conditions or treatments  Talk to your doctor, nurse or pharmacist before following any medical regimen to see if it is safe and effective for you

## 2018-05-31 ENCOUNTER — OFFICE VISIT (OUTPATIENT)
Dept: OBGYN CLINIC | Facility: CLINIC | Age: 28
End: 2018-05-31
Payer: COMMERCIAL

## 2018-05-31 VITALS
BODY MASS INDEX: 27.65 KG/M2 | HEIGHT: 68 IN | DIASTOLIC BLOOD PRESSURE: 80 MMHG | WEIGHT: 182.4 LBS | SYSTOLIC BLOOD PRESSURE: 122 MMHG

## 2018-05-31 DIAGNOSIS — N94.6 DYSMENORRHEA: ICD-10-CM

## 2018-05-31 DIAGNOSIS — N92.0 MENORRHAGIA WITH REGULAR CYCLE: Primary | ICD-10-CM

## 2018-05-31 PROCEDURE — 99214 OFFICE O/P EST MOD 30 MIN: CPT | Performed by: OBSTETRICS & GYNECOLOGY

## 2018-05-31 PROCEDURE — 87591 N.GONORRHOEAE DNA AMP PROB: CPT | Performed by: OBSTETRICS & GYNECOLOGY

## 2018-05-31 PROCEDURE — 87491 CHLMYD TRACH DNA AMP PROBE: CPT | Performed by: OBSTETRICS & GYNECOLOGY

## 2018-05-31 RX ORDER — ACETAMINOPHEN AND CODEINE PHOSPHATE 300; 30 MG/1; MG/1
1 TABLET ORAL EVERY 4 HOURS PRN
Qty: 30 TABLET | Refills: 0 | Status: SHIPPED | OUTPATIENT
Start: 2018-05-31 | End: 2022-08-03 | Stop reason: ALTCHOICE

## 2018-05-31 NOTE — PROGRESS NOTES
Assessment/Plan:  Abnormal uterine bleeding  Dysmenorrhea  Menorrhagia with irregular cycle    Plan  Screening laboratory studies  Pelvic ultrasound  Norethindrone to suppress further bleeding  Pain medication ordered     Subjective:  Abnormal bleeding greater than 14 days     Patient ID: Kendal Najjar is a 25 y o  female  HPI   68-year-old female  2 para 1011 here for follow-up regarding emergency room visit  Patient states over the past several months her periods have gotten progressively heavier and more painful occasionally passing clots  On May 25th she was seen emergency room because the pain was so severe and the bleeding so heavy  Patient states she has been bleeding for more than 14 days straight  She has not had this problem before currently she is not on any birth control currently she is not sexually active  She did relate that she had a positive pregnancy test in February this year and has spontaneous miscarriage however was never seen or treated for this event  Review of Systems   Genitourinary: Positive for menstrual problem, pelvic pain and vaginal bleeding  All other systems reviewed and are negative  Objective: Moderate pelvic pain  /80 (BP Location: Left arm, Patient Position: Sitting, Cuff Size: Large)   Ht 5' 8" (1 727 m)   Wt 82 7 kg (182 lb 6 4 oz)   LMP 2018 (Exact Date)   Breastfeeding? No   BMI 27 73 kg/m²      Physical Exam   Constitutional: She is oriented to person, place, and time  She appears well-developed and well-nourished  HENT:   Head: Normocephalic  Eyes: Pupils are equal, round, and reactive to light  Neck: Normal range of motion  Genitourinary: Vagina normal and uterus normal    Genitourinary Comments: Pelvic exam  Normal size uterus  Left adnexal pain and fullness  No CMT  Cervix is irregular  No other pelvic masses noted   Neurological: She is alert and oriented to person, place, and time  Skin: Skin is warm and dry  Psychiatric: She has a normal mood and affect   Her behavior is normal  Judgment and thought content normal

## 2018-06-01 LAB
CHLAMYDIA DNA CVX QL NAA+PROBE: NORMAL
N GONORRHOEA DNA GENITAL QL NAA+PROBE: NORMAL

## 2018-08-03 ENCOUNTER — OFFICE VISIT (OUTPATIENT)
Dept: OBGYN CLINIC | Facility: CLINIC | Age: 28
End: 2018-08-03
Payer: COMMERCIAL

## 2018-08-03 VITALS
HEIGHT: 68 IN | BODY MASS INDEX: 26.61 KG/M2 | DIASTOLIC BLOOD PRESSURE: 78 MMHG | SYSTOLIC BLOOD PRESSURE: 120 MMHG | WEIGHT: 175.6 LBS

## 2018-08-03 DIAGNOSIS — N76.0 BACTERIAL VAGINITIS: Primary | ICD-10-CM

## 2018-08-03 DIAGNOSIS — Z72.51 HIGH-RISK SEXUAL BEHAVIOR: ICD-10-CM

## 2018-08-03 DIAGNOSIS — B96.89 BACTERIAL VAGINITIS: Primary | ICD-10-CM

## 2018-08-03 LAB — SL AMB POCT WET MOUNT: ABNORMAL

## 2018-08-03 PROCEDURE — 86592 SYPHILIS TEST NON-TREP QUAL: CPT | Performed by: OBSTETRICS & GYNECOLOGY

## 2018-08-03 PROCEDURE — 87210 SMEAR WET MOUNT SALINE/INK: CPT | Performed by: OBSTETRICS & GYNECOLOGY

## 2018-08-03 PROCEDURE — 99213 OFFICE O/P EST LOW 20 MIN: CPT | Performed by: OBSTETRICS & GYNECOLOGY

## 2018-08-03 PROCEDURE — 36415 COLL VENOUS BLD VENIPUNCTURE: CPT | Performed by: OBSTETRICS & GYNECOLOGY

## 2018-08-03 PROCEDURE — 87340 HEPATITIS B SURFACE AG IA: CPT | Performed by: OBSTETRICS & GYNECOLOGY

## 2018-08-03 RX ORDER — METRONIDAZOLE 7.5 MG/G
1 GEL VAGINAL 2 TIMES DAILY
Qty: 70 G | Refills: 3 | Status: SHIPPED | OUTPATIENT
Start: 2018-08-03

## 2018-08-03 NOTE — PATIENT INSTRUCTIONS
Bacterial Vaginosis   WHAT YOU NEED TO KNOW:   Bacterial vaginosis (BV) is an infection in the vagina  It may cause vaginitis, which is irritation and inflammation of the vagina  DISCHARGE INSTRUCTIONS:   Medicines:   · Antibiotics: These are given to kill the bacteria that cause BV  They may be given as a pill or a cream to put in your vagina  Take or use as directed  · Take your medicine as directed  Contact your healthcare provider if you think your medicine is not helping or if you have side effects  Tell him of her if you are allergic to any medicine  Keep a list of the medicines, vitamins, and herbs you take  Include the amounts, and when and why you take them  Bring the list or the pill bottles to follow-up visits  Carry your medicine list with you in case of an emergency  Prevent bacterial vaginosis:   · Keep your vaginal area clean and dry:  Wear underwear and pantyhose with a cotton crotch  Wipe from front to back after you urinate or have a bowel movement  After bathing, rinse soap from your vaginal area to decrease your risk for irritation  · Do not use products that cause irritation:  Always use unscented tampons or sanitary pads  Do not use feminine sprays, powders, or scented tampons because they may cause irritation and increase your risk of BV  Detergents and fabric softeners may also cause irritation  · Do not douche: This can cause an imbalance in healthy vaginal bacteria  · Use latex condoms: This helps prevent another infection and keeps your partner from getting the infection  Contact your healthcare provider if:   · Your symptoms come back or do not improve with treatment  · You have vaginal bleeding that is not your monthly period  · You have questions or concerns about your condition or care  © 2017 [employer] Information is for End User's use only and may not be sold, redistributed or otherwise used for commercial purposes   All illustrations and images included in CareNotes® are the copyrighted property of A [employer] M [employer] Inc  or [employer]reto  The above information is an  only  It is not intended as medical advice for individual conditions or treatments  Talk to your doctor, nurse or pharmacist before following any medical regimen to see if it is safe and effective for you

## 2018-08-03 NOTE — PROGRESS NOTES
A/P  1  Bacterial Vaginosis   Discharge x 4 days   Odor   Causing some irritation    No itching mainly a smell    One swab done      2  New partner   Pt wishes to have all STD done   GC  CT Trich    Hep b rpr and HIV    Sent     3  Contraception    IUD copper ordered for the patient     She had the mirana before and the hormones made her "crazy"   Discussed different options and are aggreable to such     4   General Health Maintaince    Pap was 6/27/17 - at [address] in care everywhere      26 y/o  for complaints of Discharge and odor states that started a few days ago and has not gotten any better  There is a strong odor no itching   Color is white yellow  New partner wants testing and to start on ocp    Past medical / surgical / social / family history reviewed in detail  Medical and allergies up dated    Review of Systems - Negative except whats listed in the HPI    /78 (BP Location: Left arm, Patient Position: Sitting, Cuff Size: Standard)   Ht 5' 8" (1 727 m)   Wt 79 7 kg (175 lb 9 6 oz)   LMP 05/14/2018 (Exact Date)   Breastfeeding? No   BMI 26 70 kg/m²     Physical Exam   Constitutional: She appears well-developed and well-nourished  Genitourinary: Uterus normal  Cervix exhibits discharge  Cervix exhibits no motion tenderness and no friability  Right adnexum displays no mass, no tenderness and no fullness  Left adnexum displays no mass, no tenderness and no fullness  Vaginal discharge found       Microscopic wet-mount exam shows clue cells, excessive bacteria, pH 5, positive whiff test

## 2018-08-04 LAB — HBV SURFACE AG SER QL: NORMAL

## 2018-08-06 LAB — RPR SER QL: NORMAL

## 2022-08-03 ENCOUNTER — CONSULT (OUTPATIENT)
Dept: BARIATRICS | Facility: CLINIC | Age: 32
End: 2022-08-03
Payer: COMMERCIAL

## 2022-08-03 VITALS
HEART RATE: 76 BPM | WEIGHT: 190.5 LBS | SYSTOLIC BLOOD PRESSURE: 118 MMHG | BODY MASS INDEX: 29.9 KG/M2 | RESPIRATION RATE: 16 BRPM | HEIGHT: 67 IN | DIASTOLIC BLOOD PRESSURE: 80 MMHG

## 2022-08-03 DIAGNOSIS — E66.3 OVERWEIGHT: Primary | ICD-10-CM

## 2022-08-03 PROCEDURE — 99203 OFFICE O/P NEW LOW 30 MIN: CPT | Performed by: NURSE PRACTITIONER

## 2022-08-03 NOTE — PROGRESS NOTES
Assessment/Plan:    Overweight  - Discussed options of Healthy[facility]-Intensive Lifestyle Intervention Program, Very Low Calorie Diet-VLCD and Conservative Program and the role of weight loss medications  - Explained the importance of making lifestyle changes first before starting any anti-obesity medications  - Patient should demonstrate lifestyle changes first before anti-obesity medication can be initiated  - Patient is interested in pursuing Very Low Calorie Diet-VLCD   - New Direction product samples given  - Initial weight loss goal of 5-10% weight loss for improved health  - Weight loss can improve patient's co-morbid conditions and/or prevent weight-related complications  - Stop Adebayo Gonzalez 2/8  - Currently taking OTC magnesium  Advised to stop it for one week, then check blood work  Do not restart magnesium during VLCD  - check screening labs: CMP, A1C, fasting insulin, magnesium, TSH, lipid panel, and HCG qual    - Check EKG  VLCD Review:  Contraindications: no  Labs ordered: yes  EKG ordered: yes  HTN meds addressed: n/a  DM2 meds addressed: n/a  VLCD time restriction based on BMI: yes, 2-4 weeks depending upon response  LMP/OCP: LMP 7/31/2022/ will also check HCG                 Ambreen Craven was seen today for consult  Diagnoses and all orders for this visit:    Overweight  -     ECG 12 lead; Future  -     Comprehensive metabolic panel; Future  -     Magnesium; Future  -     HEMOGLOBIN A1C W/ EAG ESTIMATION; Future  -     TSH, 3rd generation with Free T4 reflex; Future  -     Lipid Panel with Direct LDL reflex; Future  -     Insulin, fasting; Future  -     hCG, Total, Qualitative (REFL); Future            Follow up in approximately 1 month with Non-Surgical Dietician      Subjective:   Chief Complaint   Patient presents with    Consult     MWM consult; waist 36in; goal wt 175; stop bang 2-8       Patient ID: Leopold Boast  is a 27 y o  female with excess weight/obesity here to pursue weight management  Previous notes and records have been reviewed  History reviewed  No pertinent past medical history  Past Surgical History:   Procedure Laterality Date    DENTAL IMPLANT  2022    TUMOR REMOVAL      chin - benign age 10       HPI:  Wt Readings from Last 21 Encounters:   22 86 4 kg (190 lb 8 oz)   18 79 7 kg (175 lb 9 6 oz)   18 82 7 kg (182 lb 6 4 oz)   18 84 1 kg (185 lb 6 4 oz)   18 85 2 kg (187 lb 12 8 oz)   18 86 7 kg (191 lb 3 2 oz)   18 84 kg (185 lb 2 1 oz)   10/12/17 84 8 kg (187 lb)   17 83 1 kg (183 lb 4 oz)   17 81 8 kg (180 lb 4 oz)   17 82 6 kg (182 lb)     Obesity/Excess Weight:  Severity: overweight  Onset:  Since puberty    Modifiers: Diet and Exercise  Contributing factors: Stress/Emotional Eating and eats out of boredom, tends to graze during the day  Associated symptoms: decreased mobility and clothes do not fit     Was recently evaluated for a breast reduction, but insurance would not cover  Hydration: 1 gallon water, 2 cups of coffee with almond milk creamer, 2-3 cans zero calorie seltzer, rare almond milk  Alcohol: 12 drinks per week  Smoking: denies  Exercise: gym 3 days per week 1-2 hours - treadmill or elliptical for 45 minutes and weight training rest of the time   Occupation:    Sleep: 8 hours  STOP ban/8    Highest weight: 196 lbs 4 months ago  Current weight: 190 5 lbs  Goal weight: 175 lbs    Colonoscopy: N/A  Mammogram: N/A    The following portions of the patient's history were reviewed and updated as appropriate: allergies, current medications, past family history, past medical history, past social history, past surgical history, and problem list     Review of Systems   HENT: Negative for sore throat  Respiratory: Negative for cough and shortness of breath  Cardiovascular: Negative for chest pain and palpitations     Gastrointestinal: Negative for constipation, diarrhea, nausea and vomiting  Denies GERD   Endocrine: Negative for cold intolerance and heat intolerance  Genitourinary: Negative for dysuria  Musculoskeletal: Positive for back pain (chronic)  Negative for arthralgias  Skin: Negative for rash  Neurological: Positive for headaches (advised to see PCP)  Psychiatric/Behavioral: Negative for suicidal ideas (or HI)  Denies depression   + anxiety situational        Objective:  /80   Pulse 76   Resp 16   Ht 5' 7" (1 702 m)   Wt 86 4 kg (190 lb 8 oz)   Breastfeeding No   BMI 29 84 kg/m²     Physical Exam  Vitals and nursing note reviewed  Constitutional   General appearance: Abnormal   well developed and overweight  Eyes No conjunctival injection  Ears, Nose, Mouth, and Throat Oral mucosa moist    Pulmonary   Respiratory effort: No increased work of breathing or signs of respiratory distress  Cardiovascular   Examination of extremities for edema and/or varicosities: Normal   no edema  Abdomen   Abdomen: Abnormal overweight       Musculoskeletal   Gait and station: Normal     Psychiatric   Orientation to person, place and time: Normal     Affect: appropriate

## 2022-08-03 NOTE — ASSESSMENT & PLAN NOTE
- Discussed options of [facility]-Intensive Lifestyle Intervention Program, Very Low Calorie Diet-VLCD and Conservative Program and the role of weight loss medications  - Explained the importance of making lifestyle changes first before starting any anti-obesity medications  - Patient should demonstrate lifestyle changes first before anti-obesity medication can be initiated  - Patient is interested in pursuing Very Low Calorie Diet-VLCD   - [employer] Direction product samples given  - Initial weight loss goal of 5-10% weight loss for improved health  - Weight loss can improve patient's co-morbid conditions and/or prevent weight-related complications  - Stop Mayito Diaz 2/8  - Currently taking OTC magnesium  Advised to stop it for one week, then check blood work  Do not restart magnesium during VLCD  - check screening labs: CMP, A1C, fasting insulin, magnesium, TSH, lipid panel, and HCG qual    - Check EKG  VLCD Review:  Contraindications: no  Labs ordered: yes  EKG ordered: yes  HTN meds addressed: n/a  DM2 meds addressed: n/a  VLCD time restriction based on BMI: yes, 2-4 weeks depending upon response     LMP/OCP: LMP 7/31/2022/ will also check HCG